# Patient Record
Sex: FEMALE | Race: WHITE
[De-identification: names, ages, dates, MRNs, and addresses within clinical notes are randomized per-mention and may not be internally consistent; named-entity substitution may affect disease eponyms.]

---

## 2019-02-14 ENCOUNTER — HOSPITAL ENCOUNTER (EMERGENCY)
Dept: HOSPITAL 94 - ER | Age: 84
Discharge: HOME | End: 2019-02-14
Payer: MEDICARE

## 2019-02-14 VITALS — HEIGHT: 64 IN | WEIGHT: 129.28 LBS | BODY MASS INDEX: 22.07 KG/M2

## 2019-02-14 VITALS — DIASTOLIC BLOOD PRESSURE: 90 MMHG | SYSTOLIC BLOOD PRESSURE: 166 MMHG

## 2019-02-14 DIAGNOSIS — Z90.49: ICD-10-CM

## 2019-02-14 DIAGNOSIS — I25.2: ICD-10-CM

## 2019-02-14 DIAGNOSIS — K21.9: ICD-10-CM

## 2019-02-14 DIAGNOSIS — Z87.11: ICD-10-CM

## 2019-02-14 DIAGNOSIS — J44.9: ICD-10-CM

## 2019-02-14 DIAGNOSIS — Z79.4: ICD-10-CM

## 2019-02-14 DIAGNOSIS — Z79.84: ICD-10-CM

## 2019-02-14 DIAGNOSIS — E78.00: ICD-10-CM

## 2019-02-14 DIAGNOSIS — L90.0: Primary | ICD-10-CM

## 2019-02-14 DIAGNOSIS — F32.9: ICD-10-CM

## 2019-02-14 DIAGNOSIS — I25.119: ICD-10-CM

## 2019-02-14 DIAGNOSIS — F41.9: ICD-10-CM

## 2019-02-14 DIAGNOSIS — E11.9: ICD-10-CM

## 2019-02-14 DIAGNOSIS — Z95.1: ICD-10-CM

## 2019-02-14 DIAGNOSIS — Z79.899: ICD-10-CM

## 2019-02-14 DIAGNOSIS — Z90.710: ICD-10-CM

## 2019-02-14 PROCEDURE — 99284 EMERGENCY DEPT VISIT MOD MDM: CPT

## 2019-02-14 NOTE — NUR
NO POWER AT HOME[facility] ANXIETY AND VAGINAL ULCERS. 
HX CABG, PACEMAKER, COPD, DM, LICHEN SCLEROSIS. C/OM GENERALIZED PAIN.

## 2019-02-14 NOTE — NUR
BROUGHT IN BY EMS WITH COMPLAINTS OF GENERALIZED PAIN, ANXIETY, VAGINAL 
BURNING, AND BACK PAIN. PATIENT HAS HX LICHENS SCLEROSIS OF THE VAGINA AND HAS 
BEEN UNABLE TO TAKE HERE PRESCRIBED MEDICATION. NO POWER IN HER HOME SINCE 
YESTERDAY. ALERT AND TALKATIVE. SIG OTHER AT THE BEDSIDE.

## 2019-12-05 ENCOUNTER — HOSPITAL ENCOUNTER (EMERGENCY)
Dept: HOSPITAL 94 - ER | Age: 84
Discharge: HOME | End: 2019-12-05
Payer: MEDICARE

## 2019-12-05 VITALS — DIASTOLIC BLOOD PRESSURE: 56 MMHG | SYSTOLIC BLOOD PRESSURE: 107 MMHG

## 2019-12-05 VITALS — WEIGHT: 120.26 LBS | BODY MASS INDEX: 20.53 KG/M2 | HEIGHT: 64 IN

## 2019-12-05 DIAGNOSIS — Z79.899: ICD-10-CM

## 2019-12-05 DIAGNOSIS — E78.00: ICD-10-CM

## 2019-12-05 DIAGNOSIS — G89.28: Primary | ICD-10-CM

## 2019-12-05 DIAGNOSIS — Z95.1: ICD-10-CM

## 2019-12-05 DIAGNOSIS — I25.2: ICD-10-CM

## 2019-12-05 DIAGNOSIS — F41.9: ICD-10-CM

## 2019-12-05 DIAGNOSIS — Z90.49: ICD-10-CM

## 2019-12-05 DIAGNOSIS — Z79.4: ICD-10-CM

## 2019-12-05 DIAGNOSIS — K21.9: ICD-10-CM

## 2019-12-05 DIAGNOSIS — M54.5: ICD-10-CM

## 2019-12-05 DIAGNOSIS — Z90.710: ICD-10-CM

## 2019-12-05 DIAGNOSIS — Z98.890: ICD-10-CM

## 2019-12-05 DIAGNOSIS — M25.511: ICD-10-CM

## 2019-12-05 DIAGNOSIS — F32.9: ICD-10-CM

## 2019-12-05 DIAGNOSIS — Z79.84: ICD-10-CM

## 2019-12-05 DIAGNOSIS — J44.9: ICD-10-CM

## 2019-12-05 DIAGNOSIS — I25.10: ICD-10-CM

## 2019-12-05 PROCEDURE — 99281 EMR DPT VST MAYX REQ PHY/QHP: CPT

## 2022-05-18 ENCOUNTER — HOSPITAL ENCOUNTER (OUTPATIENT)
Dept: HOSPITAL 94 - SSTAY O | Age: 87
Discharge: HOME | End: 2022-05-18
Attending: INTERNAL MEDICINE
Payer: MEDICARE

## 2022-05-18 VITALS — SYSTOLIC BLOOD PRESSURE: 144 MMHG | DIASTOLIC BLOOD PRESSURE: 66 MMHG

## 2022-05-18 VITALS — SYSTOLIC BLOOD PRESSURE: 217 MMHG | DIASTOLIC BLOOD PRESSURE: 77 MMHG

## 2022-05-18 VITALS — DIASTOLIC BLOOD PRESSURE: 64 MMHG | SYSTOLIC BLOOD PRESSURE: 134 MMHG

## 2022-05-18 VITALS — SYSTOLIC BLOOD PRESSURE: 137 MMHG | DIASTOLIC BLOOD PRESSURE: 57 MMHG

## 2022-05-18 VITALS — DIASTOLIC BLOOD PRESSURE: 63 MMHG | SYSTOLIC BLOOD PRESSURE: 143 MMHG

## 2022-05-18 VITALS — SYSTOLIC BLOOD PRESSURE: 149 MMHG | DIASTOLIC BLOOD PRESSURE: 69 MMHG

## 2022-05-18 VITALS — DIASTOLIC BLOOD PRESSURE: 65 MMHG | SYSTOLIC BLOOD PRESSURE: 113 MMHG

## 2022-05-18 VITALS — HEIGHT: 64 IN | WEIGHT: 120.37 LBS | BODY MASS INDEX: 20.55 KG/M2

## 2022-05-18 VITALS — DIASTOLIC BLOOD PRESSURE: 74 MMHG | SYSTOLIC BLOOD PRESSURE: 116 MMHG

## 2022-05-18 VITALS — DIASTOLIC BLOOD PRESSURE: 119 MMHG | SYSTOLIC BLOOD PRESSURE: 128 MMHG

## 2022-05-18 VITALS — SYSTOLIC BLOOD PRESSURE: 156 MMHG | DIASTOLIC BLOOD PRESSURE: 112 MMHG

## 2022-05-18 DIAGNOSIS — F41.9: ICD-10-CM

## 2022-05-18 DIAGNOSIS — D64.9: ICD-10-CM

## 2022-05-18 DIAGNOSIS — Z79.84: ICD-10-CM

## 2022-05-18 DIAGNOSIS — Z98.890: ICD-10-CM

## 2022-05-18 DIAGNOSIS — E11.9: ICD-10-CM

## 2022-05-18 DIAGNOSIS — E78.49: ICD-10-CM

## 2022-05-18 DIAGNOSIS — M19.90: ICD-10-CM

## 2022-05-18 DIAGNOSIS — Z90.710: ICD-10-CM

## 2022-05-18 DIAGNOSIS — E66.3: ICD-10-CM

## 2022-05-18 DIAGNOSIS — I50.9: ICD-10-CM

## 2022-05-18 DIAGNOSIS — Z45.010: Primary | ICD-10-CM

## 2022-05-18 DIAGNOSIS — Z87.11: ICD-10-CM

## 2022-05-18 DIAGNOSIS — I25.2: ICD-10-CM

## 2022-05-18 DIAGNOSIS — J44.9: ICD-10-CM

## 2022-05-18 DIAGNOSIS — Z79.01: ICD-10-CM

## 2022-05-18 DIAGNOSIS — I11.0: ICD-10-CM

## 2022-05-18 DIAGNOSIS — Z88.8: ICD-10-CM

## 2022-05-18 DIAGNOSIS — Z95.1: ICD-10-CM

## 2022-05-18 DIAGNOSIS — Z87.891: ICD-10-CM

## 2022-05-18 DIAGNOSIS — E11.40: ICD-10-CM

## 2022-05-18 DIAGNOSIS — Z79.899: ICD-10-CM

## 2022-05-18 DIAGNOSIS — I25.10: ICD-10-CM

## 2022-05-18 LAB
ALBUMIN SERPL BCP-MCNC: 3.1 G/DL (ref 3.4–5)
ANION GAP SERPL CALCULATED.3IONS-SCNC: 6 MMOL/L (ref 8–16)
APTT PPP: 21 SECONDS (ref 22–32)
BASOPHILS # BLD AUTO: 0.1 X10'3 (ref 0–0.2)
BASOPHILS NFR BLD AUTO: 0.9 % (ref 0–1)
BUN SERPL-MCNC: 10 MG/DL (ref 7–18)
BUN/CREAT SERPL: 13.3 (ref 6.6–38)
CALCIUM SERPL-MCNC: 8.7 MG/DL (ref 8.5–10.1)
CHLORIDE SERPL-SCNC: 104 MMOL/L (ref 99–107)
CO2 SERPL-SCNC: 31.2 MMOL/L (ref 24–32)
CREAT SERPL-MCNC: 0.75 MG/DL (ref 0.4–0.9)
EOSINOPHIL # BLD AUTO: 0.5 X10'3 (ref 0–0.9)
EOSINOPHIL NFR BLD AUTO: 5.9 % (ref 0–6)
ERYTHROCYTE [DISTWIDTH] IN BLOOD BY AUTOMATED COUNT: 14.3 % (ref 11.5–14.5)
GFR SERPL CREATININE-BSD FRML MDRD: 73 ML/MIN
GLUCOSE SERPL-MCNC: 130 MG/DL (ref 70–104)
HCT VFR BLD AUTO: 36.9 % (ref 35–45)
HGB BLD-MCNC: 12.2 G/DL (ref 12–16)
LYMPHOCYTES # BLD AUTO: 1.5 X10'3 (ref 1.1–4.8)
LYMPHOCYTES NFR BLD AUTO: 17.8 % (ref 21–51)
MCH RBC QN AUTO: 32 PG (ref 27–31)
MCHC RBC AUTO-ENTMCNC: 33.1 G/DL (ref 33–36.5)
MCV RBC AUTO: 96.5 FL (ref 78–98)
MONOCYTES # BLD AUTO: 0.4 X10'3 (ref 0–0.9)
MONOCYTES NFR BLD AUTO: 4.8 % (ref 2–12)
NEUTROPHILS # BLD AUTO: 6.1 X10'3 (ref 1.8–7.7)
NEUTROPHILS NFR BLD AUTO: 70.6 % (ref 42–75)
PLATELET # BLD AUTO: 223 X10'3 (ref 140–440)
PMV BLD AUTO: 9.3 FL (ref 7.4–10.4)
POTASSIUM SERPL-SCNC: 4.9 MMOL/L (ref 3.5–5.1)
RBC # BLD AUTO: 3.82 X10'6 (ref 4.2–5.6)
SODIUM SERPL-SCNC: 141 MMOL/L (ref 135–145)
WBC # BLD AUTO: 8.7 X10'3 (ref 4.5–11)

## 2022-05-18 PROCEDURE — 33227 REMOVE&REPLACE PM GEN SINGL: CPT

## 2022-05-18 PROCEDURE — 36415 COLL VENOUS BLD VENIPUNCTURE: CPT

## 2022-05-18 PROCEDURE — 99152 MOD SED SAME PHYS/QHP 5/>YRS: CPT

## 2022-05-18 PROCEDURE — 82948 REAGENT STRIP/BLOOD GLUCOSE: CPT

## 2022-05-18 PROCEDURE — 85610 PROTHROMBIN TIME: CPT

## 2022-05-18 PROCEDURE — 80048 BASIC METABOLIC PNL TOTAL CA: CPT

## 2022-05-18 PROCEDURE — 99153 MOD SED SAME PHYS/QHP EA: CPT

## 2022-05-18 PROCEDURE — 85730 THROMBOPLASTIN TIME PARTIAL: CPT

## 2022-05-18 PROCEDURE — 93005 ELECTROCARDIOGRAM TRACING: CPT

## 2022-05-18 PROCEDURE — 85025 COMPLETE CBC W/AUTO DIFF WBC: CPT

## 2022-08-29 ENCOUNTER — HOSPITAL ENCOUNTER (EMERGENCY)
Dept: HOSPITAL 94 - ER | Age: 87
Discharge: HOME | End: 2022-08-29
Payer: MEDICARE

## 2022-08-29 VITALS — SYSTOLIC BLOOD PRESSURE: 112 MMHG | DIASTOLIC BLOOD PRESSURE: 48 MMHG

## 2022-08-29 VITALS — WEIGHT: 114.64 LBS | HEIGHT: 62 IN | BODY MASS INDEX: 21.1 KG/M2

## 2022-08-29 DIAGNOSIS — Z88.5: ICD-10-CM

## 2022-08-29 DIAGNOSIS — Z98.890: ICD-10-CM

## 2022-08-29 DIAGNOSIS — M25.561: Primary | ICD-10-CM

## 2022-08-29 DIAGNOSIS — J44.9: ICD-10-CM

## 2022-08-29 DIAGNOSIS — Z88.8: ICD-10-CM

## 2022-08-29 DIAGNOSIS — E11.9: ICD-10-CM

## 2022-08-29 DIAGNOSIS — K21.9: ICD-10-CM

## 2022-08-29 DIAGNOSIS — E78.00: ICD-10-CM

## 2022-08-29 DIAGNOSIS — Z90.710: ICD-10-CM

## 2022-08-29 PROCEDURE — 99284 EMERGENCY DEPT VISIT MOD MDM: CPT

## 2022-08-29 PROCEDURE — 73564 X-RAY EXAM KNEE 4 OR MORE: CPT

## 2022-08-29 PROCEDURE — 73700 CT LOWER EXTREMITY W/O DYE: CPT

## 2023-07-30 ENCOUNTER — HOSPITAL ENCOUNTER (EMERGENCY)
Dept: HOSPITAL 94 - ER | Age: 88
Discharge: LEFT BEFORE DISCHARGE | End: 2023-07-30
Payer: MEDICARE

## 2023-07-30 VITALS — HEIGHT: 64 IN | BODY MASS INDEX: 18.99 KG/M2 | WEIGHT: 111.22 LBS

## 2023-07-30 VITALS
OXYGEN SATURATION: 99 % | HEART RATE: 85 BPM | RESPIRATION RATE: 18 BRPM | SYSTOLIC BLOOD PRESSURE: 216 MMHG | TEMPERATURE: 98.4 F | DIASTOLIC BLOOD PRESSURE: 82 MMHG

## 2023-07-30 DIAGNOSIS — R42: Primary | ICD-10-CM

## 2023-07-30 DIAGNOSIS — Z53.21: ICD-10-CM

## 2023-07-30 PROCEDURE — 99281 EMR DPT VST MAYX REQ PHY/QHP: CPT
